# Patient Record
Sex: MALE | Race: BLACK OR AFRICAN AMERICAN | NOT HISPANIC OR LATINO | URBAN - METROPOLITAN AREA
[De-identification: names, ages, dates, MRNs, and addresses within clinical notes are randomized per-mention and may not be internally consistent; named-entity substitution may affect disease eponyms.]

---

## 2021-01-01 ENCOUNTER — EMERGENCY (EMERGENCY)
Facility: HOSPITAL | Age: 61
LOS: 0 days | End: 2021-02-01
Attending: EMERGENCY MEDICINE | Admitting: EMERGENCY MEDICINE
Payer: MEDICAID

## 2021-01-01 VITALS — HEIGHT: 72 IN | WEIGHT: 250 LBS

## 2021-01-01 DIAGNOSIS — I46.9 CARDIAC ARREST, CAUSE UNSPECIFIED: ICD-10-CM

## 2021-01-01 DIAGNOSIS — I25.10 ATHEROSCLEROTIC HEART DISEASE OF NATIVE CORONARY ARTERY WITHOUT ANGINA PECTORIS: ICD-10-CM

## 2021-01-01 DIAGNOSIS — Z20.822 CONTACT WITH AND (SUSPECTED) EXPOSURE TO COVID-19: ICD-10-CM

## 2021-01-01 DIAGNOSIS — Z95.1 PRESENCE OF AORTOCORONARY BYPASS GRAFT: ICD-10-CM

## 2021-01-01 LAB
RAPID RVP RESULT: DETECTED
RV+EV RNA SPEC QL NAA+PROBE: DETECTED
SARS-COV-2 RNA SPEC QL NAA+PROBE: SIGNIFICANT CHANGE UP

## 2021-01-01 PROCEDURE — 99285 EMERGENCY DEPT VISIT HI MDM: CPT | Mod: 25

## 2021-01-01 PROCEDURE — 92950 HEART/LUNG RESUSCITATION CPR: CPT

## 2021-01-31 NOTE — ED PROVIDER NOTE - PHYSICAL EXAMINATION
PHYSICAL EXAM: I have reviewed current vital signs.  GENERAL: Unresponsive.  HEAD:  Normocephalic, atraumatic.  EYES: Conjunctiva and sclera clear.  ENT: Intubated with assisted ventilations.  NECK: Supple.  CHEST/LUNG: Tube confirmed with end tidal CO2.   HEART: Active CPR in progress with Tripp device.  ABDOMEN: Soft, obese.  EXTREMITIES: Pulseless x 4.  NEUROLOGY: Unresponsive, intubated.  SKIN: Warm and dry.

## 2021-01-31 NOTE — ED PROVIDER NOTE - PROGRESS NOTE DETAILS
Mac[place]- Patient brought to ED after 1 1/2 hrs of ACLS resuscitation done by EMS PTA. Arrived intubated, s/p 4 epis, 1g calcium gluconate, multiple amps of bicarb, amio, and narcan. Patient was shocked 5 times due to vfib, no ROSC achieved. [facility] [place] was contacted, pt's daughter [place] informed. 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 Bedside sono was done, bilateral lung sliding, no pericardial effusion, and cardiac standstill. Remained pulseless in ED. Time of death 09:11PM.

## 2021-01-31 NOTE — ED PROVIDER NOTE - CLINICAL SUMMARY MEDICAL DECISION MAKING FREE TEXT BOX
59 yo man with multiple medical problems was found in PEA arrest.   Worked on for about 1 hour.  Down about 1.5 hours.  Asystole on arrival.  Code called.

## 2021-01-31 NOTE — ED ADULT TRIAGE NOTE - CHIEF COMPLAINT QUOTE
Pt came as pre-notification for unwitnessed cardiac arrest, down for 1.5 hours on scene. As per family, pt was found face down on the floor in the house for unknown period of time. Pt was found by EMS in V. Fib, was shocked 5 times, 4 Epi given, 1 gm of Calcium, 1 amp of Bicarbonate by EMS. Pt is asystole in ER, CPR is in progress.

## 2021-01-31 NOTE — ED PROVIDER NOTE - OBJECTIVE STATEMENT
59yo M with PMH of CABG, CAD, and prior heroin/IVDU presenting to ED in cardiac arrest. Per paramedics, ACLS was provided for 1 1/2 hour PTA. Patient was shocked 5 times 2/2 vfib,

## 2021-01-31 NOTE — ED PROVIDER NOTE - ATTENDING CONTRIBUTION TO CARE
I personally evaluated the patient. I reviewed the Resident’s or Physician Assistant’s note (as assigned above), and agree with the findings and plan except as documented in my note.  59 yo man found by ALS team to be in PEA arrest.  Wa down without a perfusable rhythm for about 1.5 hours.  resusctiation by ALS did result in an episode of V-fib which was treated without success.  Patient arrived with asystole/agonal rhythm.  Code was called after cardiac standstill noted.  Daughter [place] was informed.

## 2021-01-31 NOTE — ED ADULT NURSE NOTE - CHIEF COMPLAINT QUOTE
Pt came as pre-notification for unwitnessed cardiac arrest, down for 1.5 hours on scene. As per family, pt was found face down on the floor in the house for unknown period of time. Pt was found by EMS in V. Fib, was shocked 5 times, intubated in the field, 4 Epi given, 1 gm of Calcium, 1 amp of Bicarbonate by EMS. Pt is asystole in ER, CPR is in progress.
